# Patient Record
Sex: MALE | Race: BLACK OR AFRICAN AMERICAN | Employment: FULL TIME | URBAN - METROPOLITAN AREA
[De-identification: names, ages, dates, MRNs, and addresses within clinical notes are randomized per-mention and may not be internally consistent; named-entity substitution may affect disease eponyms.]

---

## 2019-03-11 ENCOUNTER — HOSPITAL ENCOUNTER (EMERGENCY)
Age: 49
Discharge: HOME | End: 2019-03-11
Attending: EMERGENCY MEDICINE
Payer: COMMERCIAL

## 2019-03-11 ENCOUNTER — APPOINTMENT (OUTPATIENT)
Dept: GENERAL RADIOLOGY | Age: 49
End: 2019-03-11
Payer: COMMERCIAL

## 2019-03-11 VITALS
SYSTOLIC BLOOD PRESSURE: 130 MMHG | DIASTOLIC BLOOD PRESSURE: 83 MMHG | OXYGEN SATURATION: 98 % | RESPIRATION RATE: 18 BRPM | HEART RATE: 108 BPM | WEIGHT: 160 LBS | BODY MASS INDEX: 25.11 KG/M2 | HEIGHT: 67 IN | TEMPERATURE: 98.9 F

## 2019-03-11 DIAGNOSIS — R68.89 FLU-LIKE SYMPTOMS: Primary | ICD-10-CM

## 2019-03-11 LAB
ABSOLUTE EOS #: 0 K/UL (ref 0–0.4)
ABSOLUTE IMMATURE GRANULOCYTE: ABNORMAL K/UL (ref 0–0.3)
ABSOLUTE LYMPH #: 2.3 K/UL (ref 1–4.8)
ABSOLUTE MONO #: 0.6 K/UL (ref 0.2–0.8)
ANION GAP SERPL CALCULATED.3IONS-SCNC: 11 MMOL/L (ref 9–17)
BASOPHILS # BLD: 0 % (ref 0–2)
BASOPHILS ABSOLUTE: 0 K/UL (ref 0–0.2)
BUN BLDV-MCNC: 13 MG/DL (ref 6–20)
BUN/CREAT BLD: 10 (ref 9–20)
CALCIUM SERPL-MCNC: 9.2 MG/DL (ref 8.6–10.4)
CHLORIDE BLD-SCNC: 102 MMOL/L (ref 98–107)
CO2: 22 MMOL/L (ref 20–31)
CREAT SERPL-MCNC: 1.27 MG/DL (ref 0.7–1.2)
DIFFERENTIAL TYPE: ABNORMAL
DIRECT EXAM: NORMAL
DIRECT EXAM: NORMAL
EOSINOPHILS RELATIVE PERCENT: 0 % (ref 1–4)
GFR AFRICAN AMERICAN: >60 ML/MIN
GFR NON-AFRICAN AMERICAN: >60 ML/MIN
GFR SERPL CREATININE-BSD FRML MDRD: ABNORMAL ML/MIN/{1.73_M2}
GFR SERPL CREATININE-BSD FRML MDRD: ABNORMAL ML/MIN/{1.73_M2}
GLUCOSE BLD-MCNC: 110 MG/DL (ref 70–99)
HCT VFR BLD CALC: 45.5 % (ref 41–53)
HEMOGLOBIN: 15.2 G/DL (ref 13.5–17.5)
IMMATURE GRANULOCYTES: ABNORMAL %
LYMPHOCYTES # BLD: 31 % (ref 24–44)
Lab: NORMAL
Lab: NORMAL
MCH RBC QN AUTO: 31.3 PG (ref 26–34)
MCHC RBC AUTO-ENTMCNC: 33.5 G/DL (ref 31–37)
MCV RBC AUTO: 93.6 FL (ref 80–100)
MONOCYTES # BLD: 8 % (ref 1–7)
NRBC AUTOMATED: ABNORMAL PER 100 WBC
PDW BLD-RTO: 14.5 % (ref 11.5–14.5)
PLATELET # BLD: 360 K/UL (ref 130–400)
PLATELET ESTIMATE: ABNORMAL
PMV BLD AUTO: 6.3 FL (ref 6–12)
POTASSIUM SERPL-SCNC: 3.9 MMOL/L (ref 3.7–5.3)
RBC # BLD: 4.87 M/UL (ref 4.5–5.9)
RBC # BLD: ABNORMAL 10*6/UL
SEG NEUTROPHILS: 61 % (ref 36–66)
SEGMENTED NEUTROPHILS ABSOLUTE COUNT: 4.7 K/UL (ref 1.8–7.7)
SODIUM BLD-SCNC: 135 MMOL/L (ref 135–144)
SPECIMEN DESCRIPTION: NORMAL
SPECIMEN DESCRIPTION: NORMAL
WBC # BLD: 7.7 K/UL (ref 3.5–11)
WBC # BLD: ABNORMAL 10*3/UL

## 2019-03-11 PROCEDURE — 80048 BASIC METABOLIC PNL TOTAL CA: CPT

## 2019-03-11 PROCEDURE — 99284 EMERGENCY DEPT VISIT MOD MDM: CPT

## 2019-03-11 PROCEDURE — 85025 COMPLETE CBC W/AUTO DIFF WBC: CPT

## 2019-03-11 PROCEDURE — 87804 INFLUENZA ASSAY W/OPTIC: CPT

## 2019-03-11 PROCEDURE — 2580000003 HC RX 258: Performed by: EMERGENCY MEDICINE

## 2019-03-11 PROCEDURE — 71046 X-RAY EXAM CHEST 2 VIEWS: CPT

## 2019-03-11 PROCEDURE — 81003 URINALYSIS AUTO W/O SCOPE: CPT

## 2019-03-11 RX ORDER — 0.9 % SODIUM CHLORIDE 0.9 %
1000 INTRAVENOUS SOLUTION INTRAVENOUS ONCE
Status: COMPLETED | OUTPATIENT
Start: 2019-03-11 | End: 2019-03-11

## 2019-03-11 RX ADMIN — SODIUM CHLORIDE 1000 ML: 9 INJECTION, SOLUTION INTRAVENOUS at 11:10

## 2019-03-11 ASSESSMENT — PAIN DESCRIPTION - PAIN TYPE: TYPE: ACUTE PAIN

## 2019-03-11 ASSESSMENT — PAIN DESCRIPTION - LOCATION: LOCATION: ABDOMEN

## 2019-03-11 ASSESSMENT — PAIN DESCRIPTION - ORIENTATION: ORIENTATION: MID

## 2019-03-11 ASSESSMENT — PAIN SCALES - GENERAL: PAINLEVEL_OUTOF10: 7

## 2019-03-11 ASSESSMENT — PAIN DESCRIPTION - DESCRIPTORS: DESCRIPTORS: CRAMPING

## 2019-03-11 NOTE — LETTER
[facility] ED  [facility] [address]  Phone: [phone]               March 11, 2019    Patient: Dafne Forman   YOB: 1970   Date of Visit: 3/11/2019       To Whom It May Concern:    Kiet Castro was seen and treated in our emergency department on 3/11/2019. He may return to work on 03/12/2019.       Sincerely,       Katheryn Sullivan RN         Signature:__________________________________

## 2019-03-11 NOTE — ED PROVIDER NOTES
[facility]  Emergency Medicine Department    Pt Name: Michael Erwin  MRN: [id number]  [place] 1970  Date of evaluation: 3/11/2019  Provider: Shayy Rizzo MD    CHIEF COMPLAINT     Chief Complaint   Patient presents with    Abdominal Pain     with \"loose stools x2 daily\" past 3 days    Cough     yellow prod coug with bodyaches past 3 days    Nausea     past 3 days       HISTORY OF PRESENT ILLNESS  (Location/Symptom, Timing/Onset, Context/Setting,Quality, Duration, Modifying Factors, Severity.)   Michael Erwin is a 47 y.o. male who presents to the emergency department complaining of feeling generalized unwell. He states his abdominal cramping, headache, fatigue, and subjective fever. He has not taken his temperature at home. He has had no vomiting but does feel mildly nauseous. No known sick contacts. He started feeling this way on Friday evening and it has been getting progressively worse. He does report a cough productive of yellowish sputum. He also reports generalized body aches. Nursing Notes were reviewed. ALLERGIES     Pcn [penicillins]    CURRENT MEDICATIONS       Discharge Medication List as of 3/11/2019 12:06 PM      CONTINUE these medications which have NOT CHANGED    Details   Multiple Vitamins-Minerals (CENTRUM PO) Take  by mouth. Urea (CARMOL) 40 % cream Apply twice a day, Disp-1 Tube, R-3, Normal           PAST MEDICAL HISTORY   History reviewed. No pertinent past medical history. SURGICAL HISTORY     History reviewed. No pertinent surgical history. FAMILY HISTORY     History reviewed. No pertinent family history. No family status information on file. SOCIAL HISTORY      reports that he has been smoking cigarettes and cigars. He has never used smokeless tobacco. He reports that he drinks alcohol. He reports that he does not use drugs.     REVIEW OF SYSTEMS    (2-9 systems for level 4, 10 or more for level 5)     Review of Systems  GEN: +subjective fevers  CV: No CP  Pulm: No SOB, No wheezing, No chest tightness, +cough  GI: +abdominal cramping, +Nausea, No Vomiting, No diarrhea  Neuro: +HA, No numbness. No weakness  MSK: No msk pain, +generalized body aches    Except as noted above the remainder of the review of systems was reviewed and negative. PHYSICAL EXAM    (up to 7 for level 4, 8 or more for level 5)     ED Triage Vitals [03/11/19 1013]   BP Temp Temp Source Pulse Resp SpO2 Height Weight   130/83 98.9 °F (37.2 °C) Oral 108 18 98 % 5' 7\" (1.702 m) 160 lb (72.6 kg)     Physical Exam   Constitutional: He is oriented to person, place, and time. He appears well-developed and well-nourished. No distress. HENT:   Head: Normocephalic and atraumatic. Mouth/Throat: Oropharynx is clear and moist.   Eyes: EOM are normal.   Neck: Normal range of motion. Neck supple. Cardiovascular: Normal rate, regular rhythm, normal heart sounds and intact distal pulses. No murmur heard. Pulmonary/Chest: Effort normal and breath sounds normal. No respiratory distress. Abdominal: Soft. There is no tenderness. Musculoskeletal: Normal range of motion. He exhibits no tenderness or deformity. Lymphadenopathy:     He has no cervical adenopathy. Neurological: He is alert and oriented to person, place, and time. Skin: Skin is warm and dry. He is not diaphoretic. Psychiatric: He has a normal mood and affect. His behavior is normal. Judgment and thought content normal.   Nursing note and vitals reviewed. DIAGNOSTIC RESULTS     RADIOLOGY:   Non-plain film images such as CT, Ultrasound and MRI are read by theradiologist. Plain radiographic images are visualized and preliminarily interpreted by the emergency physician with the below findings:    Interpretation per the Radiologist below, if available at the time of this note:    XR CHEST STANDARD (2 VW)   Final Result   No acute cardiopulmonary process.            ED BEDSIDE ULTRASOUND:   Performed by

## 2019-11-06 ENCOUNTER — TELEPHONE (OUTPATIENT)
Dept: PODIATRY | Age: 49
End: 2019-11-06

## 2019-11-08 ENCOUNTER — OFFICE VISIT (OUTPATIENT)
Dept: PODIATRY | Age: 49
End: 2019-11-08
Payer: COMMERCIAL

## 2019-11-08 VITALS
WEIGHT: 153 LBS | BODY MASS INDEX: 24.01 KG/M2 | HEIGHT: 67 IN | DIASTOLIC BLOOD PRESSURE: 84 MMHG | HEART RATE: 73 BPM | SYSTOLIC BLOOD PRESSURE: 133 MMHG

## 2019-11-08 DIAGNOSIS — B35.1 ONYCHOMYCOSIS: ICD-10-CM

## 2019-11-08 DIAGNOSIS — L84 CORN OR CALLUS: Primary | ICD-10-CM

## 2019-11-08 DIAGNOSIS — M79.671 PAIN IN BOTH FEET: ICD-10-CM

## 2019-11-08 DIAGNOSIS — M79.672 PAIN IN BOTH FEET: ICD-10-CM

## 2019-11-08 PROCEDURE — 11057 PARNG/CUTG B9 HYPRKR LES >4: CPT | Performed by: STUDENT IN AN ORGANIZED HEALTH CARE EDUCATION/TRAINING PROGRAM

## 2019-11-08 PROCEDURE — 11720 DEBRIDE NAIL 1-5: CPT | Performed by: STUDENT IN AN ORGANIZED HEALTH CARE EDUCATION/TRAINING PROGRAM

## 2019-11-08 PROCEDURE — 99203 OFFICE O/P NEW LOW 30 MIN: CPT | Performed by: STUDENT IN AN ORGANIZED HEALTH CARE EDUCATION/TRAINING PROGRAM

## 2019-11-08 PROCEDURE — 99202 OFFICE O/P NEW SF 15 MIN: CPT | Performed by: STUDENT IN AN ORGANIZED HEALTH CARE EDUCATION/TRAINING PROGRAM

## 2019-11-08 RX ORDER — UREA 40 %
CREAM (GRAM) TOPICAL
Qty: 85 G | Refills: 2 | Status: SHIPPED | OUTPATIENT
Start: 2019-11-08

## 2021-10-01 NOTE — PROGRESS NOTES
Patient instructed to remove shoes and socks and instructed to sit in exam chair. Current PCP is No primary care provider on file. and date of last visit was n/a. Do you have a follow up visit scheduled?   No  If yes, the date is

## 2021-10-04 ENCOUNTER — OFFICE VISIT (OUTPATIENT)
Dept: PODIATRY | Age: 51
End: 2021-10-04
Payer: COMMERCIAL

## 2021-10-04 VITALS
WEIGHT: 157 LBS | HEIGHT: 67 IN | DIASTOLIC BLOOD PRESSURE: 85 MMHG | SYSTOLIC BLOOD PRESSURE: 126 MMHG | BODY MASS INDEX: 24.64 KG/M2 | HEART RATE: 97 BPM

## 2021-10-04 DIAGNOSIS — M79.672 PAIN IN BOTH FEET: ICD-10-CM

## 2021-10-04 DIAGNOSIS — D23.72 BENIGN NEOPLASM OF SKIN OF FOOT, LEFT: ICD-10-CM

## 2021-10-04 DIAGNOSIS — D23.71 BENIGN NEOPLASM OF SKIN OF FOOT, RIGHT: ICD-10-CM

## 2021-10-04 DIAGNOSIS — M79.671 PAIN IN BOTH FEET: ICD-10-CM

## 2021-10-04 DIAGNOSIS — L84 CORNS AND CALLUS: Primary | ICD-10-CM

## 2021-10-04 PROCEDURE — 99213 OFFICE O/P EST LOW 20 MIN: CPT

## 2021-10-04 PROCEDURE — 99212 OFFICE O/P EST SF 10 MIN: CPT

## 2021-10-04 PROCEDURE — 11057 PARNG/CUTG B9 HYPRKR LES >4: CPT

## 2021-10-04 RX ORDER — AMLODIPINE BESYLATE 5 MG/1
10 TABLET ORAL DAILY
COMMUNITY
Start: 2021-07-14 | End: 2022-04-20

## 2021-10-04 RX ORDER — UREA 40 %
CREAM (GRAM) TOPICAL
Qty: 227 G | Refills: 2 | Status: SHIPPED | OUTPATIENT
Start: 2021-10-04

## 2021-10-04 NOTE — PROGRESS NOTES
[address]  [address] [place] [address],  ESE Garcia  Tel: [phone]   Fax: [phone]    Subjective     CC: Painful callus    HPI:  Mercedes Brooks is a 45y.o. year old male who presents to clinic today painful calluses bilateral. Pt states he has been dealing with the pain for a little while. 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 He rates his pain 8/10 and describes the pain as stabbing pain. He denies any other pedal complaints at this time. Pt denies any nausea, vomiting, fever, chills or shortness of breath. He follows with dermatology for dermatofibrosis and lichen sclerosis. He denies any major medical issues. ROS:    Constitutional: Denies nausea, vomiting, fever, chills. Neurologic: Denies numbness, tingling, and burning in the feet. Vascular: Denies symptoms of lower extremity claudication. Skin: Denies open wounds. Otherwise negative except as noted in the HPI. PMH:  No past medical history on file. Surgical History:   No past surgical history on file. Social History:  Social History     Tobacco Use    Smoking status: Current Every Day Smoker     Types: Cigarettes, Cigars     Last attempt to quit: 2014     Years since quittin.3    Smokeless tobacco: Never Used   Substance Use Topics    Alcohol use: Yes     Comment: social    Drug use: Never       Medications:  Prior to Admission medications    Medication Sig Start Date End Date Taking? Authorizing Provider   amLODIPine (NORVASC) 5 MG tablet Take 10 mg by mouth daily  21  Yes Historical Provider, MD   Urea (CARMOL) 40 % cream Apply to the bottom of both feet 10/4/21  Yes Umair Garcia DPM   Urea (CARMOL) 40 % cream Apply to affected area daily 19  Yes Mena Manriquez DPM   Multiple Vitamins-Minerals (CENTRUM PO) Take  by mouth.    Yes Historical Provider, MD       Objective     Vitals:    10/04/21 1515   BP: 126/85   Pulse: 97       No results found for: LABA1C    Physical Exam:  General:  Alert and oriented x3. In no acute distress. Lower Extremity Physical Exam:    Vascular: DP and PT pulses are palpable, Bilateral. CFT <3 seconds to all digits, Bilateral.  No edema, Bilateral.  Hair growth is present to the level of the digits, Bilateral.     Neuro: Saph/sural/SP/DP/plantar sensation intact to light touch. Protective sensation is intact to 10/10 sites as tested with a 5.07g SWMF, Bilateral.     Musculoskeletal: EHL/FHL/GS/TA gross motor intact. Tenderness to palpation of hyperkeratotic tissue to bilateral feet. Gross deformity is absent, Bilateral.     Dermatologic: No open lesions, Bilateral.  Interdigital maceration absent, Bilateral.  Nails 4 on the left and bilateral 5th nails are thickened and elongated with subungual debris. Hyperkeratotic tissue noted to sub 5th MPJ bilateral, sub 2nd MPJ bilateral, sub hallux IPJ on the left, plantar medial IPJ on the right, posterior lateral heel bilateral.     Gait Analysis: Antalgic    Imaging: None    Assessment   Abdirahman Guevara is a 45 y.o. male with     Diagnosis Orders   1. Corns and callus          Plan   · Patient examined and evaluated  · Diagnosis and treatment options discussed in detail  · Debrided hyperkeratotic tissue as described above using a #15 blade. No bleeding noted. Patient reported 0/10 pain. · Dispensed offloading pad. · Pt educated on application of urea cream and use of pumice stone to callus  · Rx for Urea cream. Instructed to apply to affected areas daily  · Patient to RTC prn  · Please, call the office with any questions or concerns   · Discussed with Dr. Sherry Gautam This Encounter   Medications    Urea (CARMOL) 40 % cream     Sig: Apply to the bottom of both feet     Dispense:  227 g     Refill:  2     No orders of the defined types were placed in this encounter.     Javier Bolanos DPM   Podiatric Medicine & Surgery   10/4/2021 at 4:09 PM

## 2022-04-20 ENCOUNTER — HOSPITAL ENCOUNTER (OUTPATIENT)
Age: 52
Setting detail: SPECIMEN
Discharge: HOME | End: 2022-04-20

## 2022-04-20 ENCOUNTER — OFFICE VISIT (OUTPATIENT)
Dept: PODIATRY | Age: 52
End: 2022-04-20
Payer: COMMERCIAL

## 2022-04-20 VITALS
DIASTOLIC BLOOD PRESSURE: 68 MMHG | BODY MASS INDEX: 24.96 KG/M2 | HEART RATE: 90 BPM | WEIGHT: 159 LBS | HEIGHT: 67 IN | SYSTOLIC BLOOD PRESSURE: 122 MMHG

## 2022-04-20 DIAGNOSIS — L97.522 SKIN ULCER OF FOURTH TOE OF LEFT FOOT WITH FAT LAYER EXPOSED (HCC): ICD-10-CM

## 2022-04-20 DIAGNOSIS — M79.672 LEFT FOOT PAIN: Primary | ICD-10-CM

## 2022-04-20 PROCEDURE — 99213 OFFICE O/P EST LOW 20 MIN: CPT | Performed by: STUDENT IN AN ORGANIZED HEALTH CARE EDUCATION/TRAINING PROGRAM

## 2022-04-20 PROCEDURE — 11056 PARNG/CUTG B9 HYPRKR LES 2-4: CPT | Performed by: STUDENT IN AN ORGANIZED HEALTH CARE EDUCATION/TRAINING PROGRAM

## 2022-04-20 RX ORDER — AMLODIPINE BESYLATE 10 MG/1
TABLET ORAL
COMMUNITY
Start: 2022-04-10

## 2022-04-20 RX ORDER — DOXYCYCLINE HYCLATE 100 MG
TABLET ORAL
COMMUNITY
Start: 2022-04-13

## 2022-04-20 RX ORDER — CYCLOSPORINE 0.5 MG/ML
EMULSION OPHTHALMIC
COMMUNITY
Start: 2022-04-15

## 2022-04-20 RX ORDER — AMMONIUM LACTATE 12 G/100G
CREAM TOPICAL
COMMUNITY
Start: 2022-04-01

## 2022-04-20 RX ORDER — TAMSULOSIN HYDROCHLORIDE 0.4 MG/1
CAPSULE ORAL
COMMUNITY

## 2022-04-20 RX ORDER — TRIAMCINOLONE ACETONIDE 1 MG/G
CREAM TOPICAL
COMMUNITY
Start: 2022-03-30

## 2022-04-20 RX ORDER — ACYCLOVIR 400 MG/1
TABLET ORAL
COMMUNITY
Start: 2022-03-19

## 2022-04-20 RX ORDER — SULFAMETHOXAZOLE AND TRIMETHOPRIM 800; 160 MG/1; MG/1
TABLET ORAL
COMMUNITY

## 2022-04-20 RX ORDER — FLUOCINONIDE TOPICAL SOLUTION USP, 0.05% 0.5 MG/ML
SOLUTION TOPICAL
COMMUNITY

## 2022-04-20 NOTE — PROGRESS NOTES
[facility]  [address] [place] [address] [address] [place] [address], Maida Garcia  Tel: [phone]   Fax: [phone]    Subjective     CC: Painful toe    Interval history: 4/20/2022  Patient returns to clinic today complaining of pain in the left fourth toe. Patient states he was seen by his primary care doctor who prescribed a course of oral antibiotics and order x-rays of the left foot because there was concern for foot infection. Patient states the swelling and redness has decreased since he started on antibiotics but there continues to be drainage to the toe. He denies any recent trauma but is on his feet for several hours a day for work. He denies any past history of nonhealing wounds. HPI:  Livan Waldron is a 47y.o. year old male who presents to clinic today painful calluses bilateral. Pt states he has been dealing with the pain for a little while. 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 He rates his pain 8/10 and describes the pain as stabbing pain. He denies any other pedal complaints at this time. Pt denies any nausea, vomiting, fever, chills or shortness of breath. He follows with dermatology for dermatofibrosis and lichen sclerosis. He denies any major medical issues. ROS:    Constitutional: Denies nausea, vomiting, fever, chills. Neurologic: Denies numbness, tingling, and burning in the feet. Vascular: Denies symptoms of lower extremity claudication. Skin: Admits open wounds. Otherwise negative except as noted in the HPI. Objective     Vitals:    04/20/22 1443   BP: 122/68   Pulse: 90       No results found for: LABA1C    Physical Exam:  General:  Alert and oriented x3. In no acute distress.      Lower Extremity Physical Exam:    Vascular: DP and PT pulses are palpable, Bilateral. CFT <3 seconds to all digits, Bilateral.  No edema, Bilateral.  Hair growth is present to the level of the digits, Bilateral.     Neuro: Saph/sural/SP/DP/plantar sensation intact to light touch. Protective sensation is intact to 10/10 sites as tested with a 5.07g SWMF, Bilateral.     Musculoskeletal: EHL/FHL/GS/TA gross motor intact. Tenderness to palpation of left 4th toe. Gross deformity is absent, Bilateral.     Dermatologic: Callus overlying an open wound with purulent drainage to left 4th digit, probes deep but not to bone  Hyperkeratotic tissue noted to sub 5th MPJ bilateral, sub 2nd MPJ bilateral, sub hallux IPJ on the left, plantar medial IPJ on the right, posterior lateral heel bilateral.     Gait Analysis: Antalgic    Imaging:       Assessment   Twila Zamora is a 47 y.o. male with     Diagnosis Orders   1. Left foot pain  XR FOOT LEFT (MIN 3 VIEWS)   2. Skin ulcer of fourth toe of left foot with fat layer exposed ([address]  Culture, Aerobic and Anaerobic    MRI FOOT LEFT WO CONTRAST        Plan   · Patient examined and evaluated. Discussed with patient that he has an ulcer of his 4th toe that will require him to finish his course of oral antibiotics, obtain further imaging, perform daily dressing changes. Patient understands. · Debrided left 4th callus as described above to reveal an open ulcer, using combination of a #15 blade and tissue nipper. Minimal bleeding noted. Patient reported no pain. · Dispensed toe cover for 5th toe. · Pt educated s/s of worsening infection. · Order for left foot xray and MRI. · Please, call the office with any questions or concerns   · Seen and discussed with Dr. Rosette Wade. No orders of the defined types were placed in this encounter.     Orders Placed This Encounter   Procedures    Culture, Aerobic and Anaerobic     Left 4th toe     Standing Status:   Future     Number of Occurrences:   1     Standing Expiration Date:   4/20/2023    XR FOOT LEFT (MIN 3 VIEWS)     Standing Status:   Future     Standing Expiration Date:   4/20/2023     Scheduling Instructions:      Weight bearing     Order Specific Question:   Reason for exam:     Answer:   pain and wound to the 4th toe    MRI FOOT LEFT WO CONTRAST     Standing Status:   Future     Standing Expiration Date:   4/20/2023     Order Specific Question:   Reason for exam:     Answer:   Left 4th toe ulcer     Order Specific Question:   What is the area of interest?     Answer:   [address] [facility], [address] [facility]   4/25/2022 at 1:16 PM

## 2022-04-21 NOTE — PROGRESS NOTES
Patient instructed to remove shoes and socks and instructed to sit in exam chair. Current PCP is No primary care provider on file. and date of last visit was unknown. Do you have a follow up visit scheduled? No  If yes, the date is     Diabetic visit information    Blood pressure (Control is BP <140/90)  BP Readings from Last 3 Encounters:   04/20/22 122/68   10/04/21 126/85   11/08/19 133/84       BP taken with correct size cuff? - Yes   Repeated if > 140/90 Yes      Tobacco use:  Patient  reports that he has been smoking cigarettes and cigars. He has never used smokeless tobacco.  If Smoker - Cessation materials given? - Yes       Diabetic Health Maintenance Items due  Diabetes Management   Topic Date Due    Lipids  Never done       Diabetic retinal exam done in last year? - Yes   If No: remind patient that it is due and they should schedule an exam    Medications  Is patient taking any medications for diabetes? -   Yes  Have blood sugars been controlled? Fasting blood sugars under 120   -   Yes   Random home sugars or today's POCT glucose is under 180 -   Yes   []  If No to the above then patient should schedule appt with PCP. Diabetic Plan    A1C Plan  No results found for: LABA1C   []  If A1C over 8 and last result >3 months ago - Order A1C and refer to PCP   []  If last A1C over 6 months ago - Order A1C and refer to PCP for follow up   []  If elevated blood sugars > 180 - refer to PCP for follow up    []  Blood sugar controlled - A1C under 8 and last check was < 6 months      Cholesterol Plan   No results found for: Wilver Rojas   []  If LDL > 100 and last result >3 months ago - order Fasting lipids and refer to PCP for follow up   []  If LDL < 100 and over 1 year ago - Order Fasting lipids and refer to PCP for follow up   [] LDL is controlled.   LDL < 100 and checked within the last year     Blood Pressure  BP Readings from Last 3 Encounters:   04/20/22 122/68   10/04/21 126/85   11/08/19 133/84      []  If SBP >140 mmhg - refer to PCP for follow up   []  If DBP > 90 mmhg - refer to PCP for follow up   [] BP is controlled <140/90     Order labs as PCP ordered.   (ie: Lipids, A1C, CMP)

## 2022-04-22 LAB
CULTURE: ABNORMAL
CULTURE: ABNORMAL
DIRECT EXAM: ABNORMAL
DIRECT EXAM: ABNORMAL
SPECIMEN DESCRIPTION: ABNORMAL

## 2022-04-27 ENCOUNTER — OFFICE VISIT (OUTPATIENT)
Dept: PODIATRY | Age: 52
End: 2022-04-27
Payer: COMMERCIAL

## 2022-04-27 VITALS
DIASTOLIC BLOOD PRESSURE: 72 MMHG | BODY MASS INDEX: 24.25 KG/M2 | SYSTOLIC BLOOD PRESSURE: 122 MMHG | WEIGHT: 160 LBS | HEIGHT: 68 IN | HEART RATE: 92 BPM

## 2022-04-27 DIAGNOSIS — L97.522 SKIN ULCER OF FOURTH TOE OF LEFT FOOT WITH FAT LAYER EXPOSED (HCC): ICD-10-CM

## 2022-04-27 DIAGNOSIS — M79.672 LEFT FOOT PAIN: Primary | ICD-10-CM

## 2022-04-27 DIAGNOSIS — L84 CORNS AND CALLUS: ICD-10-CM

## 2022-04-27 PROCEDURE — 99213 OFFICE O/P EST LOW 20 MIN: CPT | Performed by: STUDENT IN AN ORGANIZED HEALTH CARE EDUCATION/TRAINING PROGRAM

## 2022-04-27 NOTE — PROGRESS NOTES
[facility]  [address] [place] [address] [address] , Maida Garcia  Tel: [phone]   Fax: [phone]    Subjective     CC: Painful toe    Interval history:   Patient states that he has been current for his wound to his left toe as instructed. Patient is able to go to sleep. He states that the swelling has gone down, pain is improved. Patient was finally able to wear regular shoe gear today. Denies any drainage to the wound. No other pedal complaints    Interval history: 4/20/2022  Patient returns to clinic today complaining of pain in the left fourth toe. Patient states he was seen by his primary care doctor who prescribed a course of oral antibiotics and order x-rays of the left foot because there was concern for foot infection. Patient states the swelling and redness has decreased since he started on antibiotics but there continues to be drainage to the toe. He denies any recent trauma but is on his feet for several hours a day for work. He denies any past history of nonhealing wounds. HPI:  Kiko Rosas is a 47y.o. year old male who presents to clinic today painful calluses bilateral. Pt states he has been dealing with the pain for a little while. 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 He rates his pain 8/10 and describes the pain as stabbing pain. He denies any other pedal complaints at this time. Pt denies any nausea, vomiting, fever, chills or shortness of breath. He follows with dermatology for dermatofibrosis and lichen sclerosis. He denies any major medical issues. ROS:    Constitutional: Denies nausea, vomiting, fever, chills. Neurologic: Denies numbness, tingling, and burning in the feet. Vascular: Denies symptoms of lower extremity claudication. Skin: Admits open wounds. Otherwise negative except as noted in the HPI.      Objective     Vitals:    04/27/22 1412   BP: 122/72   Pulse: 92       No results found for: LABA1C    Physical Exam:  General:  Alert and oriented x3. In no acute distress. Lower Extremity Physical Exam:    Vascular: DP and PT pulses are palpable, Bilateral. CFT <3 seconds to all digits, Bilateral.  No edema, Bilateral.  Hair growth is present to the level of the digits, Bilateral.     Neuro: Saph/sural/SP/DP/plantar sensation intact to light touch. Protective sensation is intact to 10/10 sites as tested with a 5.07g SWMF, Bilateral.     Musculoskeletal: EHL/FHL/GS/TA gross motor intact. Improved tenderness to palpation of left 4th toe. Gross deformity is absent, Bilateral.     Dermatologic: Previous wound to the fourth digit is epithelialized, no appreciated drainage. Mild hyperkeratotic overgrowth. No associate increase in warmth. Hyperkeratotic tissue noted to sub 5th MPJ bilateral, sub 2nd MPJ bilateral, sub hallux IPJ on the left, plantar medial IPJ on the right, posterior lateral heel bilateral.     Imaging: No new images      Assessment   [place] is a 47 y.o. male with     Diagnosis Orders   1. Left foot pain     2. Corns and callus     3. Skin ulcer of fourth toe of left foot with fat layer exposed ([facility] [address]          Plan   · Patient examined and evaluated. · Patient instructed to continue with good foot hygiene. Patient may shower but should make sure that the foot is dry following bathing. · Pt educated s/s of worsening infection. · Please, call the office with any questions or concerns   · Seen and discussed with Dr. Juliane Cancino    No orders of the defined types were placed in this encounter. No orders of the defined types were placed in this encounter.     Manny Strange DPM   Podiatric Medicine & Surgery   4/27/2022 at 2:37 PM

## 2022-05-11 NOTE — PROGRESS NOTES
I performed a history and physical examination of the patient and discussed management with the resident. I reviewed the residents note and agree with the documented findings and plan of care. Any areas of disagreement are noted on the chart. I was personally present for the key portions of any procedures. I have documented in the chart those procedures where I was not present during the key portions. I have reviewed the Podiatry Resident progress note. I agree with the chief complaint, past medical history, past surgical history, allergies, medications, social and family history as documented unless otherwise noted below. Documentation of the HPI, Physical Exam and Medical Decision Making performed by medical students or scribes is based on my personal performance of the HPI, PE and MDM. I have personally evaluated this patient and have completed at least one if not all key elements of the E/M (history, physical exam, and MDM). Additional findings are as noted. Vahe Mcneil DPM,D.P.M.